# Patient Record
Sex: MALE | Race: OTHER
[De-identification: names, ages, dates, MRNs, and addresses within clinical notes are randomized per-mention and may not be internally consistent; named-entity substitution may affect disease eponyms.]

---

## 2020-09-18 ENCOUNTER — HOSPITAL ENCOUNTER (INPATIENT)
Dept: HOSPITAL 15 - ER | Age: 80
LOS: 1 days | DRG: 871 | End: 2020-09-19
Attending: FAMILY MEDICINE | Admitting: FAMILY MEDICINE
Payer: MEDICARE

## 2020-09-18 VITALS — SYSTOLIC BLOOD PRESSURE: 120 MMHG | DIASTOLIC BLOOD PRESSURE: 60 MMHG

## 2020-09-18 VITALS — DIASTOLIC BLOOD PRESSURE: 44 MMHG | SYSTOLIC BLOOD PRESSURE: 110 MMHG

## 2020-09-18 VITALS — DIASTOLIC BLOOD PRESSURE: 56 MMHG | SYSTOLIC BLOOD PRESSURE: 107 MMHG

## 2020-09-18 VITALS — DIASTOLIC BLOOD PRESSURE: 60 MMHG | SYSTOLIC BLOOD PRESSURE: 120 MMHG

## 2020-09-18 VITALS — HEIGHT: 66 IN | BODY MASS INDEX: 19.56 KG/M2 | WEIGHT: 121.7 LBS

## 2020-09-18 VITALS — DIASTOLIC BLOOD PRESSURE: 61 MMHG | SYSTOLIC BLOOD PRESSURE: 106 MMHG

## 2020-09-18 VITALS — DIASTOLIC BLOOD PRESSURE: 52 MMHG | SYSTOLIC BLOOD PRESSURE: 113 MMHG

## 2020-09-18 VITALS — SYSTOLIC BLOOD PRESSURE: 144 MMHG | DIASTOLIC BLOOD PRESSURE: 75 MMHG

## 2020-09-18 VITALS — SYSTOLIC BLOOD PRESSURE: 114 MMHG | DIASTOLIC BLOOD PRESSURE: 58 MMHG

## 2020-09-18 VITALS — DIASTOLIC BLOOD PRESSURE: 58 MMHG | SYSTOLIC BLOOD PRESSURE: 113 MMHG

## 2020-09-18 DIAGNOSIS — N18.6: ICD-10-CM

## 2020-09-18 DIAGNOSIS — J96.01: ICD-10-CM

## 2020-09-18 DIAGNOSIS — J12.89: ICD-10-CM

## 2020-09-18 DIAGNOSIS — A41.89: Primary | ICD-10-CM

## 2020-09-18 DIAGNOSIS — Z79.01: ICD-10-CM

## 2020-09-18 DIAGNOSIS — E86.0: ICD-10-CM

## 2020-09-18 DIAGNOSIS — I12.0: ICD-10-CM

## 2020-09-18 DIAGNOSIS — R62.7: ICD-10-CM

## 2020-09-18 DIAGNOSIS — Z99.2: ICD-10-CM

## 2020-09-18 DIAGNOSIS — Z51.5: ICD-10-CM

## 2020-09-18 DIAGNOSIS — E16.2: ICD-10-CM

## 2020-09-18 DIAGNOSIS — Z88.8: ICD-10-CM

## 2020-09-18 DIAGNOSIS — E46: ICD-10-CM

## 2020-09-18 DIAGNOSIS — U07.1: ICD-10-CM

## 2020-09-18 DIAGNOSIS — Z66: ICD-10-CM

## 2020-09-18 DIAGNOSIS — R65.21: ICD-10-CM

## 2020-09-18 DIAGNOSIS — I21.4: ICD-10-CM

## 2020-09-18 DIAGNOSIS — J90: ICD-10-CM

## 2020-09-18 DIAGNOSIS — D63.8: ICD-10-CM

## 2020-09-18 DIAGNOSIS — J43.9: ICD-10-CM

## 2020-09-18 LAB
ALBUMIN SERPL-MCNC: 2.7 G/DL (ref 3.4–5)
ALP SERPL-CCNC: 266 U/L (ref 45–117)
ALT SERPL-CCNC: 30 U/L (ref 16–61)
ANION GAP SERPL CALCULATED.3IONS-SCNC: 7 MMOL/L (ref 5–15)
APTT PPP: 46.6 SEC (ref 23–31.2)
BILIRUB SERPL-MCNC: 0.6 MG/DL (ref 0.2–1)
BUN SERPL-MCNC: 23 MG/DL (ref 7–18)
BUN/CREAT SERPL: 6.7
CALCIUM SERPL-MCNC: 9.5 MG/DL (ref 8.5–10.1)
CHLORIDE SERPL-SCNC: 102 MMOL/L (ref 98–107)
CO2 SERPL-SCNC: 29 MMOL/L (ref 21–32)
GLUCOSE SERPL-MCNC: 149 MG/DL (ref 74–106)
HCT VFR BLD AUTO: 25.3 % (ref 41–53)
HGB BLD-MCNC: 8.7 G/DL (ref 13.5–17.5)
INR PPP: 2.3 (ref 0.9–1.15)
MCH RBC QN AUTO: 42 PG (ref 28–32)
MCV RBC AUTO: 121.5 FL (ref 80–100)
NRBC BLD QL AUTO: 0.1 %
POTASSIUM SERPL-SCNC: 4.5 MMOL/L (ref 3.5–5.1)
PROT SERPL-MCNC: 6.9 G/DL (ref 6.4–8.2)
SODIUM SERPL-SCNC: 138 MMOL/L (ref 136–145)

## 2020-09-18 PROCEDURE — 84439 ASSAY OF FREE THYROXINE: CPT

## 2020-09-18 PROCEDURE — 84484 ASSAY OF TROPONIN QUANT: CPT

## 2020-09-18 PROCEDURE — 36415 COLL VENOUS BLD VENIPUNCTURE: CPT

## 2020-09-18 PROCEDURE — 82607 VITAMIN B-12: CPT

## 2020-09-18 PROCEDURE — 86141 C-REACTIVE PROTEIN HS: CPT

## 2020-09-18 PROCEDURE — 82805 BLOOD GASES W/O2 SATURATION: CPT

## 2020-09-18 PROCEDURE — 87081 CULTURE SCREEN ONLY: CPT

## 2020-09-18 PROCEDURE — 71045 X-RAY EXAM CHEST 1 VIEW: CPT

## 2020-09-18 PROCEDURE — 36600 WITHDRAWAL OF ARTERIAL BLOOD: CPT

## 2020-09-18 PROCEDURE — 85730 THROMBOPLASTIN TIME PARTIAL: CPT

## 2020-09-18 PROCEDURE — 87070 CULTURE OTHR SPECIMN AEROBIC: CPT

## 2020-09-18 PROCEDURE — 87077 CULTURE AEROBIC IDENTIFY: CPT

## 2020-09-18 PROCEDURE — 02HV33Z INSERTION OF INFUSION DEVICE INTO SUPERIOR VENA CAVA, PERCUTANEOUS APPROACH: ICD-10-PCS | Performed by: EMERGENCY MEDICINE

## 2020-09-18 PROCEDURE — 80053 COMPREHEN METABOLIC PANEL: CPT

## 2020-09-18 PROCEDURE — 85025 COMPLETE CBC W/AUTO DIFF WBC: CPT

## 2020-09-18 PROCEDURE — 94003 VENT MGMT INPAT SUBQ DAY: CPT

## 2020-09-18 PROCEDURE — 85379 FIBRIN DEGRADATION QUANT: CPT

## 2020-09-18 PROCEDURE — 5A1935Z RESPIRATORY VENTILATION, LESS THAN 24 CONSECUTIVE HOURS: ICD-10-PCS | Performed by: FAMILY MEDICINE

## 2020-09-18 PROCEDURE — 87426 SARSCOV CORONAVIRUS AG IA: CPT

## 2020-09-18 PROCEDURE — 85610 PROTHROMBIN TIME: CPT

## 2020-09-18 PROCEDURE — 94002 VENT MGMT INPAT INIT DAY: CPT

## 2020-09-18 PROCEDURE — 83880 ASSAY OF NATRIURETIC PEPTIDE: CPT

## 2020-09-18 PROCEDURE — 84481 FREE ASSAY (FT-3): CPT

## 2020-09-18 PROCEDURE — 87040 BLOOD CULTURE FOR BACTERIA: CPT

## 2020-09-18 PROCEDURE — 83605 ASSAY OF LACTIC ACID: CPT

## 2020-09-18 PROCEDURE — 82728 ASSAY OF FERRITIN: CPT

## 2020-09-18 PROCEDURE — 93970 EXTREMITY STUDY: CPT

## 2020-09-18 PROCEDURE — 31500 INSERT EMERGENCY AIRWAY: CPT

## 2020-09-18 PROCEDURE — 83615 LACTATE (LD) (LDH) ENZYME: CPT

## 2020-09-18 PROCEDURE — 36569 INSJ PICC 5 YR+ W/O IMAGING: CPT

## 2020-09-18 PROCEDURE — 71275 CT ANGIOGRAPHY CHEST: CPT

## 2020-09-18 PROCEDURE — 87205 SMEAR GRAM STAIN: CPT

## 2020-09-18 PROCEDURE — 99291 CRITICAL CARE FIRST HOUR: CPT

## 2020-09-18 PROCEDURE — 0BH17EZ INSERTION OF ENDOTRACHEAL AIRWAY INTO TRACHEA, VIA NATURAL OR ARTIFICIAL OPENING: ICD-10-PCS | Performed by: FAMILY MEDICINE

## 2020-09-18 PROCEDURE — 83735 ASSAY OF MAGNESIUM: CPT

## 2020-09-18 PROCEDURE — 84443 ASSAY THYROID STIM HORMONE: CPT

## 2020-09-18 PROCEDURE — 87186 SC STD MICRODIL/AGAR DIL: CPT

## 2020-09-18 RX ADMIN — DEXTROSE SCH MLS/HR: 5 SOLUTION INTRAVENOUS at 16:45

## 2020-09-18 RX ADMIN — FENTANYL CITRATE SCH MLS/HR: 50 INJECTION, SOLUTION INTRAMUSCULAR; INTRAVENOUS at 13:55

## 2020-09-18 RX ADMIN — MIDAZOLAM SCH MLS/HR: 5 INJECTION, SOLUTION INTRAMUSCULAR; INTRAVENOUS at 12:50

## 2020-09-18 NOTE — NUR
OPENING NOTE

PATIENT INTUBATED SEDATED ON VERSED 15 AND FENT 150, TOLERATING VENTILATOR. VENT SETTINGS AC 
14  PEEP 5 FIO2 50% STATING >99% ON BEDSIDE MONITOR. ETT 8.0, 24@ LIP. BILATERAL CHEST 
RISE AND FALL .  WITH PAC ON DOBUTAMINE 5. PATIENT ON LEVOPHED 20 FROM ED DC'D STARTED 
ZEFERINO PER PROTOCOL. BLOOD PRESSURE 113/52. RIGHT TLC SUBCLAVIAN CDI, TRANSFUSING ALL 
MEDICATIONS. FOR GTTS AND THEIR TITRATIONS SEE IV SPREAD SHEET. RIGHT NGT @ 55CM, 
AUSCULTATED AND ASPIRATED CORRECT PLACEMENT. 18G RIGHT WRIST PATENT. ODELL TO GRAVITY. LEFT 
WRIST DIALYSIS FISTULA, AUSCULTATED BRUITS AND PALPATED THRILL. SKIN INTACT. FOR MORE 
INFORMATION SEE INTERVENTIONS. FOLLOWING HOSPITAL POLICY FOR COVID19 ISOLATION. ALL FALL AND 
SAFETY PRECAUTIONS IN PLACE.

## 2020-09-19 VITALS — SYSTOLIC BLOOD PRESSURE: 121 MMHG | DIASTOLIC BLOOD PRESSURE: 55 MMHG

## 2020-09-19 VITALS — SYSTOLIC BLOOD PRESSURE: 110 MMHG | DIASTOLIC BLOOD PRESSURE: 60 MMHG

## 2020-09-19 VITALS — DIASTOLIC BLOOD PRESSURE: 41 MMHG | SYSTOLIC BLOOD PRESSURE: 85 MMHG

## 2020-09-19 VITALS — DIASTOLIC BLOOD PRESSURE: 45 MMHG | SYSTOLIC BLOOD PRESSURE: 100 MMHG

## 2020-09-19 VITALS — DIASTOLIC BLOOD PRESSURE: 55 MMHG | SYSTOLIC BLOOD PRESSURE: 125 MMHG

## 2020-09-19 VITALS — DIASTOLIC BLOOD PRESSURE: 52 MMHG | SYSTOLIC BLOOD PRESSURE: 117 MMHG

## 2020-09-19 VITALS — DIASTOLIC BLOOD PRESSURE: 61 MMHG | SYSTOLIC BLOOD PRESSURE: 135 MMHG

## 2020-09-19 VITALS — SYSTOLIC BLOOD PRESSURE: 106 MMHG | DIASTOLIC BLOOD PRESSURE: 51 MMHG

## 2020-09-19 VITALS — DIASTOLIC BLOOD PRESSURE: 55 MMHG | SYSTOLIC BLOOD PRESSURE: 132 MMHG

## 2020-09-19 VITALS — DIASTOLIC BLOOD PRESSURE: 40 MMHG | SYSTOLIC BLOOD PRESSURE: 75 MMHG

## 2020-09-19 VITALS — DIASTOLIC BLOOD PRESSURE: 55 MMHG | SYSTOLIC BLOOD PRESSURE: 120 MMHG

## 2020-09-19 VITALS — SYSTOLIC BLOOD PRESSURE: 130 MMHG | DIASTOLIC BLOOD PRESSURE: 60 MMHG

## 2020-09-19 VITALS — DIASTOLIC BLOOD PRESSURE: 44 MMHG | SYSTOLIC BLOOD PRESSURE: 100 MMHG

## 2020-09-19 VITALS — SYSTOLIC BLOOD PRESSURE: 91 MMHG | DIASTOLIC BLOOD PRESSURE: 41 MMHG

## 2020-09-19 VITALS — DIASTOLIC BLOOD PRESSURE: 21 MMHG | SYSTOLIC BLOOD PRESSURE: 45 MMHG

## 2020-09-19 VITALS — DIASTOLIC BLOOD PRESSURE: 64 MMHG | SYSTOLIC BLOOD PRESSURE: 136 MMHG

## 2020-09-19 VITALS — SYSTOLIC BLOOD PRESSURE: 118 MMHG | DIASTOLIC BLOOD PRESSURE: 52 MMHG

## 2020-09-19 VITALS — SYSTOLIC BLOOD PRESSURE: 137 MMHG | DIASTOLIC BLOOD PRESSURE: 56 MMHG

## 2020-09-19 VITALS — DIASTOLIC BLOOD PRESSURE: 59 MMHG | SYSTOLIC BLOOD PRESSURE: 118 MMHG

## 2020-09-19 VITALS — SYSTOLIC BLOOD PRESSURE: 111 MMHG | DIASTOLIC BLOOD PRESSURE: 54 MMHG

## 2020-09-19 VITALS — DIASTOLIC BLOOD PRESSURE: 50 MMHG | SYSTOLIC BLOOD PRESSURE: 110 MMHG

## 2020-09-19 VITALS — DIASTOLIC BLOOD PRESSURE: 44 MMHG | SYSTOLIC BLOOD PRESSURE: 99 MMHG

## 2020-09-19 VITALS — SYSTOLIC BLOOD PRESSURE: 119 MMHG | DIASTOLIC BLOOD PRESSURE: 65 MMHG

## 2020-09-19 VITALS — SYSTOLIC BLOOD PRESSURE: 104 MMHG | DIASTOLIC BLOOD PRESSURE: 42 MMHG

## 2020-09-19 VITALS — SYSTOLIC BLOOD PRESSURE: 107 MMHG | DIASTOLIC BLOOD PRESSURE: 46 MMHG

## 2020-09-19 VITALS — DIASTOLIC BLOOD PRESSURE: 42 MMHG | SYSTOLIC BLOOD PRESSURE: 98 MMHG

## 2020-09-19 VITALS — SYSTOLIC BLOOD PRESSURE: 101 MMHG | DIASTOLIC BLOOD PRESSURE: 42 MMHG

## 2020-09-19 VITALS — DIASTOLIC BLOOD PRESSURE: 60 MMHG | SYSTOLIC BLOOD PRESSURE: 130 MMHG

## 2020-09-19 VITALS — DIASTOLIC BLOOD PRESSURE: 57 MMHG | SYSTOLIC BLOOD PRESSURE: 114 MMHG

## 2020-09-19 VITALS — DIASTOLIC BLOOD PRESSURE: 38 MMHG | SYSTOLIC BLOOD PRESSURE: 114 MMHG

## 2020-09-19 VITALS — SYSTOLIC BLOOD PRESSURE: 89 MMHG | DIASTOLIC BLOOD PRESSURE: 42 MMHG

## 2020-09-19 VITALS — DIASTOLIC BLOOD PRESSURE: 41 MMHG | SYSTOLIC BLOOD PRESSURE: 75 MMHG

## 2020-09-19 VITALS — SYSTOLIC BLOOD PRESSURE: 103 MMHG | DIASTOLIC BLOOD PRESSURE: 41 MMHG

## 2020-09-19 VITALS — SYSTOLIC BLOOD PRESSURE: 121 MMHG | DIASTOLIC BLOOD PRESSURE: 60 MMHG

## 2020-09-19 VITALS — SYSTOLIC BLOOD PRESSURE: 125 MMHG | DIASTOLIC BLOOD PRESSURE: 62 MMHG

## 2020-09-19 VITALS — SYSTOLIC BLOOD PRESSURE: 140 MMHG | DIASTOLIC BLOOD PRESSURE: 70 MMHG

## 2020-09-19 VITALS — DIASTOLIC BLOOD PRESSURE: 51 MMHG | SYSTOLIC BLOOD PRESSURE: 121 MMHG

## 2020-09-19 VITALS — SYSTOLIC BLOOD PRESSURE: 100 MMHG | DIASTOLIC BLOOD PRESSURE: 42 MMHG

## 2020-09-19 VITALS — DIASTOLIC BLOOD PRESSURE: 42 MMHG | SYSTOLIC BLOOD PRESSURE: 89 MMHG

## 2020-09-19 VITALS — DIASTOLIC BLOOD PRESSURE: 41 MMHG | SYSTOLIC BLOOD PRESSURE: 102 MMHG

## 2020-09-19 VITALS — DIASTOLIC BLOOD PRESSURE: 51 MMHG | SYSTOLIC BLOOD PRESSURE: 120 MMHG

## 2020-09-19 VITALS — SYSTOLIC BLOOD PRESSURE: 112 MMHG | DIASTOLIC BLOOD PRESSURE: 61 MMHG

## 2020-09-19 VITALS — SYSTOLIC BLOOD PRESSURE: 120 MMHG | DIASTOLIC BLOOD PRESSURE: 55 MMHG

## 2020-09-19 VITALS — SYSTOLIC BLOOD PRESSURE: 95 MMHG | DIASTOLIC BLOOD PRESSURE: 43 MMHG

## 2020-09-19 LAB
ALBUMIN SERPL-MCNC: 2.4 G/DL (ref 3.4–5)
ALP SERPL-CCNC: 223 U/L (ref 45–117)
ALT SERPL-CCNC: 25 U/L (ref 16–61)
ANION GAP SERPL CALCULATED.3IONS-SCNC: 5 MMOL/L (ref 5–15)
BILIRUB SERPL-MCNC: 0.5 MG/DL (ref 0.2–1)
BUN SERPL-MCNC: 28 MG/DL (ref 7–18)
BUN/CREAT SERPL: 7.4
CALCIUM SERPL-MCNC: 9.5 MG/DL (ref 8.5–10.1)
CHLORIDE SERPL-SCNC: 102 MMOL/L (ref 98–107)
CO2 SERPL-SCNC: 27 MMOL/L (ref 21–32)
GLUCOSE SERPL-MCNC: 182 MG/DL (ref 74–106)
HCT VFR BLD AUTO: 25.9 % (ref 41–53)
HGB BLD-MCNC: 8.6 G/DL (ref 13.5–17.5)
MCH RBC QN AUTO: 41.2 PG (ref 28–32)
MCV RBC AUTO: 124.9 FL (ref 80–100)
NRBC BLD QL AUTO: 0.1 %
POTASSIUM SERPL-SCNC: 4.5 MMOL/L (ref 3.5–5.1)
PROT SERPL-MCNC: 6.2 G/DL (ref 6.4–8.2)
SODIUM SERPL-SCNC: 134 MMOL/L (ref 136–145)

## 2020-09-19 RX ADMIN — NOREPINEPHRINE BITARTRATE SCH MLS/HR: 1 INJECTION, SOLUTION, CONCENTRATE INTRAVENOUS at 01:25

## 2020-09-19 RX ADMIN — DEXTROSE SCH MLS/HR: 5 SOLUTION INTRAVENOUS at 01:27

## 2020-09-19 RX ADMIN — MIDAZOLAM SCH MLS/HR: 5 INJECTION, SOLUTION INTRAMUSCULAR; INTRAVENOUS at 01:26

## 2020-09-19 RX ADMIN — PIPERACILLIN SODIUM AND TAZOBACTAM SODIUM SCH MLS/HR: .25; 2 INJECTION, POWDER, LYOPHILIZED, FOR SOLUTION INTRAVENOUS at 09:33

## 2020-09-19 RX ADMIN — PIPERACILLIN SODIUM AND TAZOBACTAM SODIUM SCH MLS/HR: .25; 2 INJECTION, POWDER, LYOPHILIZED, FOR SOLUTION INTRAVENOUS at 02:00

## 2020-09-19 RX ADMIN — FENTANYL CITRATE SCH MLS/HR: 50 INJECTION, SOLUTION INTRAMUSCULAR; INTRAVENOUS at 01:26

## 2020-09-19 RX ADMIN — NOREPINEPHRINE BITARTRATE SCH MLS/HR: 1 INJECTION, SOLUTION, CONCENTRATE INTRAVENOUS at 09:33

## 2020-09-19 NOTE — NUR
PT'S WIFE PT[facility]S [facility]

## 2020-09-19 NOTE — NUR
PARTIAL ASSESSMENT COMPLETED AT THIS TIME.

ALL GTTS VERIFIED. VSS, AFEBRILE. CHART CHECKED. 

PT ON COVID 19 ISOLATION. ALL CARE IS CLUSTERED.

## 2020-09-19 NOTE — NUR
CALLED [facility] DEPARTMENT TO REPORT THE PT'S DEATH TO THE CORNER'S TO GET CLEARANCE TO 
RELEASE BODY TO [facility].

ALREADY RECEIVED CLEARANCE FROM [employer] TO RELEASE BODY TO MORTUARY.

## 2020-09-19 NOTE — NUR
I CALLED BACK MORTUARY, PAPERWORK UPDATED ON MORGUE, [place]  [place] 
[facility] [place]

## 2020-09-19 NOTE — NUR
PT DNR/ PT'S FAMILY CAME TO VISIT , PT'S WIFE WAS ALLOWED TO GO INSIDE PT'S ROOM FOR A 
COUPLE MIN. WITH PROTECTIVE PPE TO SAY GOOD BYE TO PT. DR. MCKEON HAD TALKED TO HER 
REGARDING PT'S CONDITION AND SHE HAD ALREADY MADE A DECISION ALONG WITH HER SON REGARDING 
WHAT TO DO. PT HAD BEEN SICK FOR MONTHS PRIOR TO THIS ADMISSION. PT'S SON ALSO VISITING BUT 
NOT GOING IN PT'S ROOM AT THIS TIME.

## 2020-09-19 NOTE — NUR
PT , DR MCKEON WAS PRESENT AT TIME OF DEATH. HE PRONOUNCED PT. I CALLED PT'S WIFE 
KEENA TO INFORM HE OF PT'S DEATH, SHE ALSO SPOKE WITH DR. MCKEON. SHE'LL CALL BACK WITH 
THE NAME OF THE  HOME ONCE SHE FINDS ONE.

## 2020-09-19 NOTE — NUR
SPOKE WITH MD DICKERSON

INFORMED MD DICKERSON THAT PATIENT IS NOT TOLERATING ZEFERINO BP 75/40, RECEIVED ORDER TO RESTART 
LEVOPHED AT PREVIOUS RATE 20

## 2020-09-19 NOTE — NUR
PT'S WIFE HAD MADE ARRANGEMENTS TO HAVE BODY PICKED UP BY  [facility] MORTUARY, HOWEVER  
MORTUARY CALLED STATING THEY CANNOT  BODY UNTIL THEY DETERMINE IF PT IS COVID + OR 
NEGATIVE. I TOLD THEM THEY COULD TREAT PT AS + AS WE WERE THEN THEY TOLD ME THEY WERE GOING 
TO RUN THE INFORMATION BY THEIR SUPERVISOR. 15 MIN LATER I RECEIVED A CALL STATING THEY WERE 
NOT TAKING THE PATIENT. I TOLD THIS INFORMATION TO HOUSE SUPERVISOR AUDELIA AND I ALSO  
NOTIFIED PT'S WIFE  KEENA THAT SHE NEEDED TO PICK A DIFFERENT MORTUARY. THAT WE CAN ONLY 
HOLD THE BODY IN OUR MORGUE FOR 2 DAYS. CONTACT # [phone]. SHE  WILL START LOOKING 
FOR ANOTHER MORTUARY THAT WILL TREAT PT AS A COVID +.  I TOLD HER THE RESULTS TAKES COUPLE 
OF DAYS TO COME BACK.

## 2020-09-19 NOTE — NUR
AM ASSESSMENT COMPLETED. PT'S WIFE CALLED SHE WANTS TO COME IN TO SEE PT AND DETERMINE CODE 
STATUS AND POSSIBLY A TERMINAL WEAN. PT CURRENTLY ON LEVOPHED AT 14 COSTA/MIN FOR BP SUPPORT , 
DOBUTAMINE A 5 COSTA/KG/MIN FOR RENAL PERFUSION, PT IS HD AND HAS NO UOP. PT CURRENTLY ON 
VERSED AT 5 MG/HR FOR SEDATION AND FENTANYL AT 75 MCG/HR ALL GTTS ARE INFUSING THROUGH TLC 
LOCATED ON RT IJ. DRESSING IS CDI.  ORAL CARE PROVIDED AT THIS TIME + COUGH AND GAG. PT HAS 
MODERATE AMOUNT OF THICK TAN SECRETIONS THROUGH ETT AND SMALL AMOUNT OF THICK TAN SECRETIONS 
THROUGH ORAL CAVITY. LS CTA AND DIMINISHED ON THE BASIS CHEST IS BARREL SHAPE. PT'S SKIN IS 
+ 2 PITTING THROUGHOUT AND HAS DUSKY DISCOLORATION AND PURPLISH DISCOLORED LOWER 
EXTREMITIES, DP/PT ARE WEAK . REPOSITIONED FOR COMFORT.

## 2020-09-19 NOTE — NUR
POST MORTEM CARE RENDERED TO PT. ALL LINES REMOVED WITH CHARGE AAKASH MAHARAJ'S ASSISTANCE. PT 
TAGGED. PLACED ON BODY BAG. PT'S BELONGINGS ALREADY GIVEN TO PT'S SPOUSE.

## 2020-09-19 NOTE — NUR
ATTEMPT TO CALL FAMILY

USED PHONE NUMBER [phone] TO CALL KEENA WIFE. UNABLE TO LEAVE VOICE MAIL. CALL FAILED

## 2020-09-21 LAB
T3FREE SERPL-MCNC: 0.79 PG/ML (ref 2.3–4.2)
T4 FREE SERPL-MCNC: 0.8 NG/DL (ref 0.89–1.76)
